# Patient Record
(demographics unavailable — no encounter records)

---

## 2017-10-06 NOTE — MMO
BILATERAL MAMMOGRAMS:

 

DATE:  10/06/17 

 

HISTORY:  

Screening mammography. 

 

COMPARISON:  

07/25/13 and 07/29/15. 

 

FINDINGS:

Scattered fibroglandular densities and benign-appearing calcifications are again demonstrated. No do
minant mass or suspicious calcifications. 

 

The study was evaluated with the assistance of computer-aided detection. 

 

IMPRESSION: 

 

BIRADS 1:  Negative

 

Suggest routine follow-up.   

 

POS: [id number]